# Patient Record
Sex: FEMALE | Race: WHITE | NOT HISPANIC OR LATINO | URBAN - METROPOLITAN AREA
[De-identification: names, ages, dates, MRNs, and addresses within clinical notes are randomized per-mention and may not be internally consistent; named-entity substitution may affect disease eponyms.]

---

## 2017-12-02 ENCOUNTER — EMERGENCY (EMERGENCY)
Facility: HOSPITAL | Age: 82
LOS: 1 days | Discharge: HOME | End: 2017-12-02
Attending: EMERGENCY MEDICINE | Admitting: EMERGENCY MEDICINE
Payer: SELF-PAY

## 2017-12-02 VITALS
RESPIRATION RATE: 14 BRPM | DIASTOLIC BLOOD PRESSURE: 46 MMHG | TEMPERATURE: 98 F | HEART RATE: 70 BPM | SYSTOLIC BLOOD PRESSURE: 130 MMHG | OXYGEN SATURATION: 97 %

## 2017-12-02 LAB
ALBUMIN SERPL ELPH-MCNC: 3.3 G/DL — SIGNIFICANT CHANGE UP (ref 3.3–5)
ALP SERPL-CCNC: 108 U/L — SIGNIFICANT CHANGE UP (ref 40–120)
ALT FLD-CCNC: 12 U/L — SIGNIFICANT CHANGE UP (ref 4–33)
AMYLASE P1 CFR SERPL: 18 U/L — LOW (ref 25–125)
APPEARANCE UR: CLEAR — SIGNIFICANT CHANGE UP
AST SERPL-CCNC: 15 U/L — SIGNIFICANT CHANGE UP (ref 4–32)
BASE EXCESS BLDV CALC-SCNC: -2.2 MMOL/L — SIGNIFICANT CHANGE UP
BASOPHILS # BLD AUTO: 0.02 K/UL — SIGNIFICANT CHANGE UP (ref 0–0.2)
BASOPHILS NFR BLD AUTO: 0.3 % — SIGNIFICANT CHANGE UP (ref 0–2)
BILIRUB SERPL-MCNC: 0.5 MG/DL — SIGNIFICANT CHANGE UP (ref 0.2–1.2)
BILIRUB UR-MCNC: NEGATIVE — SIGNIFICANT CHANGE UP
BLOOD GAS VENOUS - CREATININE: 0.74 MG/DL — SIGNIFICANT CHANGE UP (ref 0.5–1.3)
BLOOD UR QL VISUAL: NEGATIVE — SIGNIFICANT CHANGE UP
BUN SERPL-MCNC: 12 MG/DL — SIGNIFICANT CHANGE UP (ref 7–23)
CALCIUM SERPL-MCNC: 8.1 MG/DL — LOW (ref 8.4–10.5)
CHLORIDE BLDV-SCNC: 98 MMOL/L — SIGNIFICANT CHANGE UP (ref 96–108)
CHLORIDE SERPL-SCNC: 94 MMOL/L — LOW (ref 98–107)
CO2 SERPL-SCNC: 20 MMOL/L — LOW (ref 22–31)
COLOR SPEC: SIGNIFICANT CHANGE UP
CREAT SERPL-MCNC: 0.77 MG/DL — SIGNIFICANT CHANGE UP (ref 0.5–1.3)
EOSINOPHIL # BLD AUTO: 0.07 K/UL — SIGNIFICANT CHANGE UP (ref 0–0.5)
EOSINOPHIL NFR BLD AUTO: 1.1 % — SIGNIFICANT CHANGE UP (ref 0–6)
GAS PNL BLDV: 127 MMOL/L — LOW (ref 136–146)
GLUCOSE BLDV-MCNC: 106 — HIGH (ref 70–99)
GLUCOSE SERPL-MCNC: 104 MG/DL — HIGH (ref 70–99)
GLUCOSE UR-MCNC: NEGATIVE — SIGNIFICANT CHANGE UP
HCO3 BLDV-SCNC: 22 MMOL/L — SIGNIFICANT CHANGE UP (ref 20–27)
HCT VFR BLD CALC: 38.8 % — SIGNIFICANT CHANGE UP (ref 34.5–45)
HCT VFR BLDV CALC: 39.8 % — SIGNIFICANT CHANGE UP (ref 34.5–45)
HGB BLD-MCNC: 13.1 G/DL — SIGNIFICANT CHANGE UP (ref 11.5–15.5)
HGB BLDV-MCNC: 12.9 G/DL — SIGNIFICANT CHANGE UP (ref 11.5–15.5)
IMM GRANULOCYTES # BLD AUTO: 0.02 # — SIGNIFICANT CHANGE UP
IMM GRANULOCYTES NFR BLD AUTO: 0.3 % — SIGNIFICANT CHANGE UP (ref 0–1.5)
KETONES UR-MCNC: NEGATIVE — SIGNIFICANT CHANGE UP
LACTATE BLDV-MCNC: 1.2 MMOL/L — SIGNIFICANT CHANGE UP (ref 0.5–2)
LEUKOCYTE ESTERASE UR-ACNC: NEGATIVE — SIGNIFICANT CHANGE UP
LIDOCAIN IGE QN: 10.1 U/L — SIGNIFICANT CHANGE UP (ref 7–60)
LYMPHOCYTES # BLD AUTO: 0.84 K/UL — LOW (ref 1–3.3)
LYMPHOCYTES # BLD AUTO: 13.4 % — SIGNIFICANT CHANGE UP (ref 13–44)
MAGNESIUM SERPL-MCNC: 2 MG/DL — SIGNIFICANT CHANGE UP (ref 1.6–2.6)
MCHC RBC-ENTMCNC: 29.3 PG — SIGNIFICANT CHANGE UP (ref 27–34)
MCHC RBC-ENTMCNC: 33.8 % — SIGNIFICANT CHANGE UP (ref 32–36)
MCV RBC AUTO: 86.8 FL — SIGNIFICANT CHANGE UP (ref 80–100)
MONOCYTES # BLD AUTO: 0.92 K/UL — HIGH (ref 0–0.9)
MONOCYTES NFR BLD AUTO: 14.7 % — HIGH (ref 2–14)
NEUTROPHILS # BLD AUTO: 4.4 K/UL — SIGNIFICANT CHANGE UP (ref 1.8–7.4)
NEUTROPHILS NFR BLD AUTO: 70.2 % — SIGNIFICANT CHANGE UP (ref 43–77)
NITRITE UR-MCNC: NEGATIVE — SIGNIFICANT CHANGE UP
NRBC # FLD: 0 — SIGNIFICANT CHANGE UP
PCO2 BLDV: 38 MMHG — LOW (ref 41–51)
PH BLDV: 7.38 PH — SIGNIFICANT CHANGE UP (ref 7.32–7.43)
PH UR: 6.5 — SIGNIFICANT CHANGE UP (ref 4.6–8)
PLATELET # BLD AUTO: 310 K/UL — SIGNIFICANT CHANGE UP (ref 150–400)
PMV BLD: 10.3 FL — SIGNIFICANT CHANGE UP (ref 7–13)
PO2 BLDV: 31 MMHG — LOW (ref 35–40)
POTASSIUM BLDV-SCNC: 3.6 MMOL/L — SIGNIFICANT CHANGE UP (ref 3.4–4.5)
POTASSIUM SERPL-MCNC: 3.8 MMOL/L — SIGNIFICANT CHANGE UP (ref 3.5–5.3)
POTASSIUM SERPL-SCNC: 3.8 MMOL/L — SIGNIFICANT CHANGE UP (ref 3.5–5.3)
PROT SERPL-MCNC: 6.3 G/DL — SIGNIFICANT CHANGE UP (ref 6–8.3)
PROT UR-MCNC: NEGATIVE — SIGNIFICANT CHANGE UP
RBC # BLD: 4.47 M/UL — SIGNIFICANT CHANGE UP (ref 3.8–5.2)
RBC # FLD: 13.7 % — SIGNIFICANT CHANGE UP (ref 10.3–14.5)
RBC CASTS # UR COMP ASSIST: SIGNIFICANT CHANGE UP (ref 0–?)
SAO2 % BLDV: 55.8 % — LOW (ref 60–85)
SODIUM SERPL-SCNC: 128 MMOL/L — LOW (ref 135–145)
SP GR SPEC: 1.01 — SIGNIFICANT CHANGE UP (ref 1–1.03)
TROPONIN T SERPL-MCNC: < 0.06 NG/ML — SIGNIFICANT CHANGE UP (ref 0–0.06)
TSH SERPL-MCNC: 4.61 UIU/ML — HIGH (ref 0.27–4.2)
UROBILINOGEN FLD QL: NORMAL E.U. — SIGNIFICANT CHANGE UP (ref 0.1–0.2)
WBC # BLD: 6.27 K/UL — SIGNIFICANT CHANGE UP (ref 3.8–10.5)
WBC # FLD AUTO: 6.27 K/UL — SIGNIFICANT CHANGE UP (ref 3.8–10.5)
WBC UR QL: SIGNIFICANT CHANGE UP (ref 0–?)

## 2017-12-02 PROCEDURE — 74177 CT ABD & PELVIS W/CONTRAST: CPT | Mod: 26

## 2017-12-02 PROCEDURE — 99285 EMERGENCY DEPT VISIT HI MDM: CPT

## 2017-12-02 RX ORDER — PIPERACILLIN AND TAZOBACTAM 4; .5 G/20ML; G/20ML
3.38 INJECTION, POWDER, LYOPHILIZED, FOR SOLUTION INTRAVENOUS ONCE
Qty: 0 | Refills: 0 | Status: DISCONTINUED | OUTPATIENT
Start: 2017-12-02 | End: 2017-12-02

## 2017-12-02 RX ORDER — SODIUM CHLORIDE 9 MG/ML
1000 INJECTION INTRAMUSCULAR; INTRAVENOUS; SUBCUTANEOUS ONCE
Qty: 0 | Refills: 0 | Status: COMPLETED | OUTPATIENT
Start: 2017-12-02 | End: 2017-12-02

## 2017-12-02 RX ADMIN — SODIUM CHLORIDE 1000 MILLILITER(S): 9 INJECTION INTRAMUSCULAR; INTRAVENOUS; SUBCUTANEOUS at 22:12

## 2017-12-02 RX ADMIN — SODIUM CHLORIDE 2000 MILLILITER(S): 9 INJECTION INTRAMUSCULAR; INTRAVENOUS; SUBCUTANEOUS at 16:58

## 2017-12-02 NOTE — ED ADULT NURSE NOTE - OBJECTIVE STATEMENT
pt received to rm 1 is a 89y f c/o abd pain. Pt is a&ox3 and is ambulatory at baseline. As per pts daughter, pt has been having abd pain for past 3 days, has been feeling nauseous, has had decreased appetite for past fews days. Pt daughter reports that pt has had syncopal episodes and "fainted last night" and fell. Pt skin appears intact, however large bruises present on anterior aspect of bilateral lower legs. Pt respirations appear even and unlabored. Pt abd appears soft and non-distended. Pt placed on cardiac monitor. 20 gauge placed in rt AC, labs drawn and sent. 1L bolus normal saline started. Pt currently at CT scan. Will re-assess on return to ED. pt received to rm 1 is a 89y f c/o abd pain. Pt is a&ox3 and is ambulatory at baseline. As per pts daughter, pt has been having abd pain for past 3 days, has been feeling nauseous, has had decreased appetite for past fews days. Pt daughter reports that pt has had syncopal episodes and "fainted last night" and fell. Pt denies hitting head. Pt skin appears intact, however large bruises present on anterior aspect of bilateral lower legs. As per daughter pt received those bruises "from bumping into objects, mom easily bruises". Pt respirations appear even and unlabored. Pt abd appears soft and non-distended. Pt placed on cardiac monitor. 20 gauge placed in rt AC, labs drawn and sent. 1L bolus normal saline started. Pt currently at CT scan. Will re-assess on return to ED. pt received to rm 1 is a 89y f c/o abd pain. Pt is a&ox3 and is ambulatory at baseline. As per pts daughter, pt has been having abd pain for past 3 days, has been feeling nauseous, has had decreased appetite for past fews days. Pt daughter reports that pt has had syncopal episodes and "fainted last night" and fell. Pt denies hitting head. Pt skin appears intact, however large bruises present on anterior aspect of bilateral lower legs. As per daughter pt received those bruises "from bumping into objects, mom easily bruises". Pt respirations appear even and unlabored. Pt abd appears distended and tender to touch. Pt placed on cardiac monitor. 20 gauge placed in rt AC, labs drawn and sent. 1L bolus normal saline started. Pt currently at CT scan. Will re-assess on return to ED.

## 2017-12-02 NOTE — ED PROVIDER NOTE - CARE PLAN
Principal Discharge DX:	Pneumobilia  Instructions for follow-up, activity and diet:	1) Dr. Busch will call your daughter tomorrow to check on how you are feeling.   2) Please follow-up with your primary care doctor within the next 3 days.  Please call today or tomorrow for an appointment.  If you cannot follow-up with your doctor(s), please return to the ED for any urgent issues.  2) If you have any worsening of symptoms or any other concerns please return to the ED immediately.  3) Please continue taking your home medications as directed.  4) You may have been given a copy of your labs and/or imaging.  Please go over these with your primary care doctor.

## 2017-12-02 NOTE — ED PROVIDER NOTE - OBJECTIVE STATEMENT
89 y.o. female PMH colon Ca s/p resection 2003, history from daughter.  89 y.o. female PMH colon Ca s/p resection 2003, c.o rlq pain, diarrhea and syncope. history from daughter.  89 y.o. female PMH colon Ca s/p resection 2003, c.o rlq pain, diarrhea and syncope.  has been having diarrhea x 3 days, feeling weak, non bloody.  patient's family member is doctor and prescribed augmentin, today symptoms became worse, right sided abd pain.  no emesis.  no fever.  travel from [place] approx 3 months ago.  took cipro approx 2 months ago. no recent hospitalizations.  pain worse when standing.  daughter states patient was getting ready to go, was in bed, daughter left and room and came back and saw patient unconscious in bed with feet hanging off bed.

## 2017-12-02 NOTE — ED PROVIDER NOTE - CRITICAL CARE PROVIDED
documentation/interpretation of diagnostic studies/consult w/ pt's family directly relating to pts condition/direct patient care (not related to procedure)/additional history taking

## 2017-12-02 NOTE — ED PROVIDER NOTE - PLAN OF CARE
1) Dr. Busch will call your daughter tomorrow to check on how you are feeling.   2) Please follow-up with your primary care doctor within the next 3 days.  Please call today or tomorrow for an appointment.  If you cannot follow-up with your doctor(s), please return to the ED for any urgent issues.  2) If you have any worsening of symptoms or any other concerns please return to the ED immediately.  3) Please continue taking your home medications as directed.  4) You may have been given a copy of your labs and/or imaging.  Please go over these with your primary care doctor.

## 2017-12-02 NOTE — ED ADULT TRIAGE NOTE - CHIEF COMPLAINT QUOTE
Pt c/o abd pain and vomiting with diarrhea x3 days and today she had syncopal episode in bed today.  Denies head trauma

## 2017-12-02 NOTE — ED PROVIDER NOTE - MEDICAL DECISION MAKING DETAILS
Attending Note (Chace): patient with syncope, diarrhea and abd pain.  most likely syncope from hypovolemia from AGE.  concernining is abd tenderness, CT abd/pelvis, r/o diverticulitis/complications of diverticulitis.  will give IVF and reassess.

## 2017-12-03 VITALS
HEART RATE: 79 BPM | TEMPERATURE: 99 F | DIASTOLIC BLOOD PRESSURE: 45 MMHG | OXYGEN SATURATION: 96 % | SYSTOLIC BLOOD PRESSURE: 135 MMHG | RESPIRATION RATE: 16 BRPM

## 2017-12-03 NOTE — CONSULT NOTE ADULT - SUBJECTIVE AND OBJECTIVE BOX
GENERAL SURGERY CONSULT NOTE  p    HPI    PAST MEDICAL & SURGICAL HISTORY:  PVD (peripheral vascular disease)  Hypothyroid  Atrial fibrillation  HTN (hypertension)      Systemic:	[ ] Fever	[ ] Chills	[ ] Night sweats    [ ] Fatigue	[ ] Other  [] Cardiovascular:  [] Pulmonary:  [] Renal/Urologic:  [] Gastrointestinal:   [] Metabolic:  [] Neurologic:  [] Hematologic:  [] ENT:  [] Ophthalmologic:  [] Musculoskeletal:    MEDICATIONS  (STANDING):    MEDICATIONS  (PRN):      Allergies    No Known Allergies    Intolerances        SOCIAL HISTORY:    FAMILY HISTORY:      Vital Signs Last 24 Hrs  T(C): 37.2 (03 Dec 2017 01:07), Max: 37.3 (02 Dec 2017 21:48)  T(F): 99 (03 Dec 2017 01:07), Max: 99.1 (02 Dec 2017 21:48)  HR: 79 (03 Dec 2017 01:07) (70 - 85)  BP: 135/45 (03 Dec 2017 01:07) (130/46 - 169/48)  BP(mean): --  RR: 16 (03 Dec 2017 01:07) (10 - 17)  SpO2: 96% (03 Dec 2017 01:07) (96% - 98%)    PHYSICAL EXAM:    Constitutional: NAD    Eyes: anicteric    ENMT: no rhinorrhea    Neck: supple    Respiratory: Clear bilaterally, respirations not labored, no retractions    Cardiovascular: RRR, NL S1S2    Gastrointestinal: Soft, ND, NT    Genitourinary: Normal external genitalia    Rectal:    Extremities: No deformities    Vascular: Ext. warm, normal cap refill    Neurological: sensation and motor intact to all extremities    Skin: warm, dry, no rash    Lymph Nodes: no palpable adenopathy      LABS:                        13.1   6.27  )-----------( 310      ( 02 Dec 2017 16:47 )             38.8     12-    128<L>  |  94<L>  |  12  ----------------------------<  104<H>  3.8   |  20<L>  |  0.77    Ca    8.1<L>      02 Dec 2017 16:43  Mg     2.0     12-    TPro  6.3  /  Alb  3.3  /  TBili  0.5  /  DBili  x   /  AST  15  /  ALT  12  /  AlkPhos  108  12-      Urinalysis Basic - ( 02 Dec 2017 18:59 )    Color: PLYEL / Appearance: CLEAR / S.012 / pH: 6.5  Gluc: NEGATIVE / Ketone: NEGATIVE  / Bili: NEGATIVE / Urobili: NORMAL E.U.   Blood: NEGATIVE / Protein: NEGATIVE / Nitrite: NEGATIVE   Leuk Esterase: NEGATIVE / RBC: 0-2 / WBC 0-2   Sq Epi: x / Non Sq Epi: x / Bacteria: x        RADIOLOGY & ADDITIONAL STUDIES: GENERAL SURGERY CONSULT NOTE  [id number]    HPI  89 yo F with h/o lap converted to open bacilio in 2016 with intraoperative cholangiogram for gangrenous cholecystitis and CBD obstruction, now presenting to ER with RLQ abdominal pain. Pt reports RLQ pain for 3 days. She also reports diarrhea, one episode of vomiting and nausea. She denies fevers. Her family members have had similar symptoms recently as well. She denies RUQ pain.     PAST MEDICAL & SURGICAL HISTORY:  PVD (peripheral vascular disease)  Hypothyroid  Atrial fibrillation  HTN (hypertension)      Systemic:	[ ] Fever	[ ] Chills	[ ] Night sweats    [ ] Fatigue	[ ] Other  [] Cardiovascular:  [] Pulmonary:  [] Renal/Urologic:  [x] Gastrointestinal: RLQ pain, diarrhea  [] Metabolic:  [] Neurologic:  [] Hematologic:  [] ENT:  [] Ophthalmologic:  [] Musculoskeletal:    MEDICATIONS  (STANDING):  oxyCODONE 5 mg oral tablet: 2 tab(s) orally every 6 hours, As Needed MDD:8 - for moderate pain  · 	bacitracin 500 units/g topical ointment: 1 application topically 4 times a day  · 	docusate sodium 100 mg oral capsule: 1 cap(s) orally 3 times a day  · 	senna oral tablet: 2 tab(s) orally once a day (at bedtime)  · 	levothyroxine 75 mcg (0.075 mg) oral tablet: 1 tab(s) orally once a day  · 	acetaminophen 325 mg oral tablet: 2 tab(s) orally every 6 hours, As needed, Mild Pain  · 	amLODIPine 10 mg oral tablet: 1 tab(s) orally once a day  · 	metoprolol succinate 50 mg oral tablet, extended release: 1 tab(s) orally once a day  · 	isosorbide dinitrate: 30 milligram(s) orally once a day      Allergies  No Known Allergies    SOCIAL HISTORY:  Lives with family    FAMILY HISTORY:  Not contributory    Vital Signs Last 24 Hrs  T(C): 37.2 (03 Dec 2017 01:07), Max: 37.3 (02 Dec 2017 21:48)  T(F): 99 (03 Dec 2017 01:07), Max: 99.1 (02 Dec 2017 21:48)  HR: 79 (03 Dec 2017 01:07) (70 - 85)  BP: 135/45 (03 Dec 2017 01:07) (130/46 - 169/48)  BP(mean): --  RR: 16 (03 Dec 2017 01:07) (10 - 17)  SpO2: 96% (03 Dec 2017 01:07) (96% - 98%)    PHYSICAL EXAM:    Constitutional: NAD    Eyes: anicteric    ENMT: no rhinorrhea    Neck: supple    Respiratory: Clear bilaterally, respirations not labored, no retractions    Cardiovascular: RRR, NL S1S2    Gastrointestinal: Soft, ND, mild tenderness in RLQ, nontender in RUQ    Genitourinary: Normal external genitalia    Extremities: No deformities    Vascular: Ext. warm, normal cap refill    Neurological: sensation and motor intact to all extremities    Skin: warm, dry, no rash    Lymph Nodes: no palpable adenopathy      LABS:                        13.1   6.27  )-----------( 310      ( 02 Dec 2017 16:47 )             38.8     12    128<L>  |  94<L>  |  12  ----------------------------<  104<H>  3.8   |  20<L>  |  0.77    Ca    8.1<L>      02 Dec 2017 16:43  Mg     2.0     12    TPro  6.3  /  Alb  3.3  /  TBili  0.5  /  DBili  x   /  AST  15  /  ALT  12  /  AlkPhos  108  12      Urinalysis Basic - ( 02 Dec 2017 18:59 )    Color: PLYEL / Appearance: CLEAR / S.012 / pH: 6.5  Gluc: NEGATIVE / Ketone: NEGATIVE  / Bili: NEGATIVE / Urobili: NORMAL E.U.   Blood: NEGATIVE / Protein: NEGATIVE / Nitrite: NEGATIVE   Leuk Esterase: NEGATIVE / RBC: 0-2 / WBC 0-2   Sq Epi: x / Non Sq Epi: x / Bacteria: x        RADIOLOGY & ADDITIONAL STUDIES:  < from: CT Abdomen and Pelvis w/ IV Cont (17 @ 18:02) >    IMPRESSION:     Mild thickening and mucosal hyperenhancement of the terminal ileum and a   few short segments of distal ileum compatible with an ileitis. Mild   upstream dilatation of small bowel loops.    Moderate-sized left inguinal hernia containing unobstructed segments of   small bowel.    Colonic diverticulosis without evidence of diverticulitis.    Postcholecystectomy with mild to moderate intrahepatic and extra hepatic   biliary ductal dilatation and pneumobilia.    Chronic appearing compression fracture of L2.    < end of copied text >

## 2017-12-03 NOTE — CONSULT NOTE ADULT - ASSESSMENT
89yo F with ileitis and pneumobilia s/p open bacilio 1 year ago. Pneumobilia appears asymptomatic.     - IVF resuscitation  - PO trial  - If able to tolerate PO and maintain hydration, may discharge home  - No acute surgical intervention  - No further work up for pneumobilia at this time  - D/W Dr. Zana Borrero   [id number]

## 2017-12-04 LAB
BACTERIA UR CULT: SIGNIFICANT CHANGE UP
SPECIMEN SOURCE: SIGNIFICANT CHANGE UP

## 2022-12-26 NOTE — ED PROVIDER NOTE - PROGRESS NOTE DETAILS
Andie Quiñones MD PGY1: pt signed out to me. Pt able to tolerate PO. Surgery attg, Dr. Conway had seen pt briefly and was unable to return to complete the c/s 2/2 an emergent case. Spoke to surgery resident Belen Borrero who discussed c/w Dr. Conway. Zoraida will call daughter tomorrow to make sure pt is doing well. Discussed plan w pt's daughter who agrees to plan and d/c of pt. Speaking Coherently